# Patient Record
Sex: MALE | Race: WHITE | NOT HISPANIC OR LATINO | URBAN - METROPOLITAN AREA
[De-identification: names, ages, dates, MRNs, and addresses within clinical notes are randomized per-mention and may not be internally consistent; named-entity substitution may affect disease eponyms.]

---

## 2018-04-18 ENCOUNTER — EMERGENCY (EMERGENCY)
Facility: HOSPITAL | Age: 58
LOS: 1 days | Discharge: HOME | End: 2018-04-18
Attending: EMERGENCY MEDICINE
Payer: COMMERCIAL

## 2018-04-18 VITALS
RESPIRATION RATE: 16 BRPM | SYSTOLIC BLOOD PRESSURE: 151 MMHG | HEART RATE: 72 BPM | DIASTOLIC BLOOD PRESSURE: 74 MMHG | TEMPERATURE: 97 F | OXYGEN SATURATION: 99 %

## 2018-04-18 VITALS
DIASTOLIC BLOOD PRESSURE: 94 MMHG | TEMPERATURE: 98 F | HEART RATE: 70 BPM | HEIGHT: 71 IN | WEIGHT: 210.1 LBS | RESPIRATION RATE: 16 BRPM | OXYGEN SATURATION: 99 % | SYSTOLIC BLOOD PRESSURE: 168 MMHG

## 2018-04-18 PROCEDURE — 99283 EMERGENCY DEPT VISIT LOW MDM: CPT | Mod: 25

## 2018-04-18 PROCEDURE — 10060 I&D ABSCESS SIMPLE/SINGLE: CPT

## 2018-04-18 PROCEDURE — 87070 CULTURE OTHR SPECIMN AEROBIC: CPT

## 2018-04-18 RX ADMIN — Medication 100 MILLIGRAM(S): at 20:17

## 2018-04-18 NOTE — ED PROVIDER NOTE - MEDICAL DECISION MAKING DETAILS
57 y.o male w/ right upper back abscess x 2 days  1. I&D, With patient consent, Wound culture  2. Doxycycline 100mg bid x 10 days  3. Follow up with PCP in 2 days. If symptoms worsen, return to ED.

## 2018-04-18 NOTE — ED ADULT TRIAGE NOTE - CHIEF COMPLAINT QUOTE
redness, swelling and pain to area around a mole on right upper back  pt noticed today. no open wound

## 2018-04-18 NOTE — ED PROVIDER NOTE - SKIN COLOR
(+) 1x1cm indurated lesion w/ flocculence right upper back, posteriorly lower scapular boarder. (-) discharge, (-) bony tenderness (+) 1x1cm indurated lesion w/ fluctuance right upper back, posteriorly lower scapular boarder. (-) discharge, (-) bony tenderness

## 2018-04-18 NOTE — ED PROVIDER NOTE - OBJECTIVE STATEMENT
Patient is 57 y.o male presents to ED c/o redness and swelling to right upper back x 2 days. Pt denies trauma, fall, fever, chills, discharge, numbness, previous injury/infection or other associated symptoms.

## 2018-04-18 NOTE — ED PROVIDER NOTE - ATTENDING CONTRIBUTION TO CARE
56 yo M presents with abscess to his L scapular area developing over past 2 days. mild pain, worse with laying on it. No f/ch. feels well otherwise.   PE there is a [id number] circular area of erythema with induration and fluctuance over L scapular area. TTP.   I performed US of the area, which revealed a 1x1cm abscess.   Performed I&D of the area, with evacuation of small amount of pus. patient started on doxycyline in the ER. wound culture sent and pending. 56 yo M presents with abscess to his L scapular area developing over past 2 days. mild pain, worse with laying on it. No f/ch. feels well otherwise.   PE there is a [id number] circular area of erythema with induration and fluctuance over L scapular area. TTP.   I performed US of the area, which revealed a 1x1cm abscess.   Performed I&D of the area, with evacuation of small amount of pus. patient started on doxycyline in the ER. wound culture sent and pending. VS bryan.   patient discharged with instructions to take motrin/tylenol for pain, course of doxycyline, and f/u with PMD in 1-2 days for wound check    The patient was discharged from the ED in stable condition. All results of today's workup were discussed with the patient and all questions/concerns were addressed. All discharge instructions were thoroughly discussed with the patient, as well as important warning signs and new/ worsening symptoms which should necessitate patient's immediate return to the ED. The patient is agreeable with discharge and expresses full understanding of all instructions given.

## 2018-04-18 NOTE — ED ADULT NURSE NOTE - OBJECTIVE STATEMENT
58 y/o male presenting to ED c/o reddness, and swelling on right upper back. Pt first noticed the swelling 2 days ago. Denies injury to area. Area is reddened swollen, and painful to touch. Safety and comfort measures maintained.

## 2018-04-20 LAB
CULTURE RESULTS: SIGNIFICANT CHANGE UP
SPECIMEN SOURCE: SIGNIFICANT CHANGE UP

## 2025-04-15 NOTE — ED PROCEDURE NOTE - NS ED PROCEDURE ASSISTED BY
Supervision was available Cheilitis Normal Treatment: I recommended application of Vaseline or Aquaphor numerous times a day (as often as every hour) and before going to bed. Nosebleeds Normal Treatment: I explained this is common when taking isotretinoin. I recommended saline mist in each nostril multiple times a day. If this worsens they will contact us. Use Therapeutic Ranged Or Therapeutic Target: please select Range or Target Hypercholesterolemia Monitoring: I explained this is common when taking isotretinoin. We will monitor closely. Myalgia Treatment: I explained this is common when taking isotretinoin. If this worsens they will contact us. They may try OTC ibuprofen. Pounds Preamble Statement (Weight Entered In Details Tab): Reported Weight in pounds: Weight Units: kilograms Cheilitis Aggressive Treatment: I recommended application of Vaseline or Aquaphor numerous times a day (as often as every hour) and before going to bed. I also prescribed a topical steroid for twice daily use. Kilograms Preamble Statement (Weight Entered In Details Tab): Reported Weight in kilograms: Months Of Therapy Completed: 5 Xerosis Normal Treatment: I recommended application of Cetaphil or CeraVe numerous times a day and before going to bed to all dry areas. Next Month's Dosage: 60mg Daily Hypertriglyceridemia Monitoring: I explained this is common when taking isotretinoin. If this worsens they will contact us. Female Completion Statement: After discussing her treatment course we decided to discontinue isotretinoin therapy at this time. I explained that she would need to continue her birth control methods for at least one month after the last dosage. She should also get a pregnancy test one month after the last dose. She shouldn't donate blood for one month after the last dose. She should call with any new symptoms of depression. Lower Range (In Mg/Kg): 120 Xerosis Aggressive Treatment: I recommended application of Cetaphil or CeraVe numerous times a day and before going to bed to all dry areas. I also prescribed a topical steroid for twice daily use. Retinoid Dermatitis Normal Treatment: I recommended more frequent application of Cetaphil or CeraVe to the areas of dermatitis. Headache Monitoring: I recommended monitoring the headaches for now. There is no evidence of increased intracranial pressure. They were instructed to call if the headaches are worsening. Upper Range (In Mg/Kg): 150 Dosing Month 1 (Required For Cumulative Dosing): 30mg Daily Male Completion Statement: After discussing his treatment course we decided to discontinue isotretinoin therapy at this time. He shouldn't donate blood for one month after the last dose. He should call with any new symptoms of depression. Use Enhanced Counseling Feature (Automatic): No Retinoid Dermatitis Aggressive Treatment: I recommended more frequent application of Cetaphil or CeraVe to the areas of dermatitis. I also prescribed a topical steroid for twice daily use until the dermatitis resolves. Add Associated Diagnosis When Managing Medication Side Effects: Yes Target Cumulative Dosage (In Mg/Kg): 135 Detail Level: Zone What Is The Patient's Gender: Male Xerosis Normal Treatment: I recommended application of Cetaphil or CeraVe numerous times a day going to bed to all dry areas. Xerosis Aggressive Treatment: I recommended application of Cetaphil or CeraVe numerous times a day going to bed to all dry areas. I also prescribed a topical steroid for twice daily use. Counseling Text: I reviewed the side effect in detail. Patient should get monthly blood tests, not donate blood, not drive at night if vision affected, and not share medication. Patient Weight (Optional But Required For Cumulative Dose-Numbers And Decimals Only): 60.4 kilos. Female Pregnancy Counseling Text: Female patients should also be on two forms of birth control while taking this medication and for one month after their last dose.